# Patient Record
Sex: FEMALE | Race: OTHER | HISPANIC OR LATINO | URBAN - METROPOLITAN AREA
[De-identification: names, ages, dates, MRNs, and addresses within clinical notes are randomized per-mention and may not be internally consistent; named-entity substitution may affect disease eponyms.]

---

## 2017-09-22 ENCOUNTER — EMERGENCY (EMERGENCY)
Facility: HOSPITAL | Age: 62
LOS: 1 days | Discharge: HOME | End: 2017-09-22
Admitting: EMERGENCY MEDICINE
Payer: MEDICAID

## 2017-09-22 VITALS
OXYGEN SATURATION: 98 % | RESPIRATION RATE: 16 BRPM | DIASTOLIC BLOOD PRESSURE: 98 MMHG | HEART RATE: 82 BPM | TEMPERATURE: 98 F | SYSTOLIC BLOOD PRESSURE: 161 MMHG

## 2017-09-22 DIAGNOSIS — F33.2 MAJOR DEPRESSIVE DISORDER, RECURRENT SEVERE WITHOUT PSYCHOTIC FEATURES: ICD-10-CM

## 2017-09-22 DIAGNOSIS — N83.20 UNSPECIFIED OVARIAN CYSTS: Chronic | ICD-10-CM

## 2017-09-22 DIAGNOSIS — Z90.89 ACQUIRED ABSENCE OF OTHER ORGANS: Chronic | ICD-10-CM

## 2017-09-22 DIAGNOSIS — G56.01 CARPAL TUNNEL SYNDROME, RIGHT UPPER LIMB: Chronic | ICD-10-CM

## 2017-09-22 LAB
ALBUMIN SERPL ELPH-MCNC: 4.7 G/DL — SIGNIFICANT CHANGE UP (ref 3.3–5)
ALP SERPL-CCNC: 138 U/L — HIGH (ref 40–120)
ALT FLD-CCNC: 89 U/L — HIGH (ref 4–33)
AMPHET UR-MCNC: NEGATIVE — SIGNIFICANT CHANGE UP
APAP SERPL-MCNC: < 15 UG/ML — LOW (ref 15–25)
APPEARANCE UR: CLEAR — SIGNIFICANT CHANGE UP
AST SERPL-CCNC: 64 U/L — HIGH (ref 4–32)
BACTERIA # UR AUTO: SIGNIFICANT CHANGE UP
BARBITURATES MEASUREMENT: NEGATIVE — SIGNIFICANT CHANGE UP
BARBITURATES UR SCN-MCNC: NEGATIVE — SIGNIFICANT CHANGE UP
BASOPHILS # BLD AUTO: 0.05 K/UL — SIGNIFICANT CHANGE UP (ref 0–0.2)
BASOPHILS NFR BLD AUTO: 0.8 % — SIGNIFICANT CHANGE UP (ref 0–2)
BENZODIAZ SERPL-MCNC: NEGATIVE — SIGNIFICANT CHANGE UP
BENZODIAZ UR-MCNC: NEGATIVE — SIGNIFICANT CHANGE UP
BILIRUB SERPL-MCNC: 0.3 MG/DL — SIGNIFICANT CHANGE UP (ref 0.2–1.2)
BILIRUB UR-MCNC: NEGATIVE — SIGNIFICANT CHANGE UP
BLOOD UR QL VISUAL: NEGATIVE — SIGNIFICANT CHANGE UP
BUN SERPL-MCNC: 13 MG/DL — SIGNIFICANT CHANGE UP (ref 7–23)
CALCIUM SERPL-MCNC: 10 MG/DL — SIGNIFICANT CHANGE UP (ref 8.4–10.5)
CANNABINOIDS UR-MCNC: NEGATIVE — SIGNIFICANT CHANGE UP
CHLORIDE SERPL-SCNC: 104 MMOL/L — SIGNIFICANT CHANGE UP (ref 98–107)
CO2 SERPL-SCNC: 24 MMOL/L — SIGNIFICANT CHANGE UP (ref 22–31)
COCAINE METAB.OTHER UR-MCNC: NEGATIVE — SIGNIFICANT CHANGE UP
COLOR SPEC: SIGNIFICANT CHANGE UP
CREAT SERPL-MCNC: 0.74 MG/DL — SIGNIFICANT CHANGE UP (ref 0.5–1.3)
EOSINOPHIL # BLD AUTO: 0.13 K/UL — SIGNIFICANT CHANGE UP (ref 0–0.5)
EOSINOPHIL NFR BLD AUTO: 2.2 % — SIGNIFICANT CHANGE UP (ref 0–6)
ETHANOL BLD-MCNC: < 10 MG/DL — SIGNIFICANT CHANGE UP
GLUCOSE SERPL-MCNC: 94 MG/DL — SIGNIFICANT CHANGE UP (ref 70–99)
GLUCOSE UR-MCNC: NEGATIVE — SIGNIFICANT CHANGE UP
HCT VFR BLD CALC: 43.3 % — SIGNIFICANT CHANGE UP (ref 34.5–45)
HGB BLD-MCNC: 14.7 G/DL — SIGNIFICANT CHANGE UP (ref 11.5–15.5)
IMM GRANULOCYTES # BLD AUTO: 0.02 # — SIGNIFICANT CHANGE UP
IMM GRANULOCYTES NFR BLD AUTO: 0.3 % — SIGNIFICANT CHANGE UP (ref 0–1.5)
KETONES UR-MCNC: NEGATIVE — SIGNIFICANT CHANGE UP
LEUKOCYTE ESTERASE UR-ACNC: NEGATIVE — SIGNIFICANT CHANGE UP
LYMPHOCYTES # BLD AUTO: 2.82 K/UL — SIGNIFICANT CHANGE UP (ref 1–3.3)
LYMPHOCYTES # BLD AUTO: 47.8 % — HIGH (ref 13–44)
MCHC RBC-ENTMCNC: 29.9 PG — SIGNIFICANT CHANGE UP (ref 27–34)
MCHC RBC-ENTMCNC: 33.9 % — SIGNIFICANT CHANGE UP (ref 32–36)
MCV RBC AUTO: 88.2 FL — SIGNIFICANT CHANGE UP (ref 80–100)
METHADONE UR-MCNC: NEGATIVE — SIGNIFICANT CHANGE UP
MONOCYTES # BLD AUTO: 0.61 K/UL — SIGNIFICANT CHANGE UP (ref 0–0.9)
MONOCYTES NFR BLD AUTO: 10.3 % — SIGNIFICANT CHANGE UP (ref 2–14)
MUCOUS THREADS # UR AUTO: SIGNIFICANT CHANGE UP
NEUTROPHILS # BLD AUTO: 2.27 K/UL — SIGNIFICANT CHANGE UP (ref 1.8–7.4)
NEUTROPHILS NFR BLD AUTO: 38.6 % — LOW (ref 43–77)
NITRITE UR-MCNC: NEGATIVE — SIGNIFICANT CHANGE UP
NRBC # FLD: 0 — SIGNIFICANT CHANGE UP
OPIATES UR-MCNC: NEGATIVE — SIGNIFICANT CHANGE UP
OXYCODONE UR-MCNC: NEGATIVE — SIGNIFICANT CHANGE UP
PCP UR-MCNC: NEGATIVE — SIGNIFICANT CHANGE UP
PH UR: 6.5 — SIGNIFICANT CHANGE UP (ref 4.6–8)
PLATELET # BLD AUTO: 253 K/UL — SIGNIFICANT CHANGE UP (ref 150–400)
PMV BLD: 10.4 FL — SIGNIFICANT CHANGE UP (ref 7–13)
POTASSIUM SERPL-MCNC: 4.5 MMOL/L — SIGNIFICANT CHANGE UP (ref 3.5–5.3)
POTASSIUM SERPL-SCNC: 4.5 MMOL/L — SIGNIFICANT CHANGE UP (ref 3.5–5.3)
PROT SERPL-MCNC: 7.6 G/DL — SIGNIFICANT CHANGE UP (ref 6–8.3)
PROT UR-MCNC: NEGATIVE — SIGNIFICANT CHANGE UP
RBC # BLD: 4.91 M/UL — SIGNIFICANT CHANGE UP (ref 3.8–5.2)
RBC # FLD: 12.8 % — SIGNIFICANT CHANGE UP (ref 10.3–14.5)
RBC CASTS # UR COMP ASSIST: SIGNIFICANT CHANGE UP (ref 0–?)
SALICYLATES SERPL-MCNC: < 5 MG/DL — LOW (ref 15–30)
SODIUM SERPL-SCNC: 145 MMOL/L — SIGNIFICANT CHANGE UP (ref 135–145)
SP GR SPEC: 1.01 — SIGNIFICANT CHANGE UP (ref 1–1.03)
SQUAMOUS # UR AUTO: SIGNIFICANT CHANGE UP
TSH SERPL-MCNC: 0.98 UIU/ML — SIGNIFICANT CHANGE UP (ref 0.27–4.2)
UROBILINOGEN FLD QL: NORMAL E.U. — SIGNIFICANT CHANGE UP (ref 0.1–0.2)
WBC # BLD: 5.9 K/UL — SIGNIFICANT CHANGE UP (ref 3.8–10.5)
WBC # FLD AUTO: 5.9 K/UL — SIGNIFICANT CHANGE UP (ref 3.8–10.5)
WBC UR QL: SIGNIFICANT CHANGE UP (ref 0–?)

## 2017-09-22 PROCEDURE — 99285 EMERGENCY DEPT VISIT HI MDM: CPT | Mod: 25

## 2017-09-22 PROCEDURE — 90792 PSYCH DIAG EVAL W/MED SRVCS: CPT | Mod: GC

## 2017-09-22 PROCEDURE — 93010 ELECTROCARDIOGRAM REPORT: CPT | Mod: 59

## 2017-09-22 NOTE — ED BEHAVIORAL HEALTH ASSESSMENT NOTE - REFERRAL / APPOINTMENT DETAILS
patient instructed to call insurance company for list of referral sources; provided Crisis [facility] number as well as central intake number as backup

## 2017-09-22 NOTE — ED PROVIDER NOTE - OBJECTIVE STATEMENT
61 y/o F hx HTN, Hypothyroid, Depression presents too ER w c/o worsening depression and suicidal ideation. Denies active suicidal plan. Admits to not sleeping and has lost motivation in daily activities. . Denies falling, punching or kicking any objects. Denies pain, SOB, fever, chills , chest/ abdominal discomfort. Denies HI/AH/VH. Denies recent use of alcohol or illicit drugs.

## 2017-09-22 NOTE — ED BEHAVIORAL HEALTH ASSESSMENT NOTE - RISK ASSESSMENT
Patient is currently at moderate risk for suicide. Acute risk factors include major mood episode, thoughts of not wanting to live, ambivalent plan, means, insomnia, and anxiety. Protective factors include future orientation, ability to safety plan, good executive functioning/desire for treatment, and adamant denial of intent.

## 2017-09-22 NOTE — ED BEHAVIORAL HEALTH ASSESSMENT NOTE - CASE SUMMARY
Patient seen and evaluated, agree with above.  Briefly, patient is a 63yo F, employed as an , domiciled with her sister, with history of b/l total knee replacement last year, and history of Lyme disease, referred yesterday by her PCP for evaluation of depression.  Patient endorses significant depressed mood, told resident that she has a supply of meds from her treatment of Lyme disease, and that she thinks taking these medications to kill herself on a snowy winter day is not out of the realm of possibility.  She endorses depressed mood, low energy, feeling guilty re: her feelings of depression.  She declines voluntary inpatient psychiatric hospitalization at this time.  She further stated that she is starting a new job on Monday as a  for the [employer] in the [address].  Reports that she is proud of career and “my skills, my mind” and wants to be able to start this job.  States further that she has a 2yo granddaughter, and that she picks up her granddaughter from  every Tuesday and watches her while her 29yo daughter takes a class.  She lives with her sister (resident obtained collateral info).  Reports that she started [employer] recently (gained a lot of weight after her knee replacement), and plans on cooking for the week this weekend, also plans on going to the beach with her sister.      Patient is future-oriented, and her sister has agreed to keep patient’s medication safe and will keep gun locked away (it is patient’s brother in law’s gun, currently in the garage, patient will have no access).  Patient is agreeable to following up with a psychiatrist.  She will contact her previous psychiatrist from years ago (patient cannot recall name at this time), call her insurance company ([employer]), and will also be given information on the Crisis Clinic for urgent care.  Patient is aware to call 911 or return to ER if symptoms worsen.  Her sister is aware of the plan.

## 2017-09-22 NOTE — ED ADULT TRIAGE NOTE - CHIEF COMPLAINT QUOTE
Pt sent by PCP with increasing depression and Suicidal thoughts.  Pt also reports L eye redness, discharge , pain and itching.  Pt is calm and cooperative at triage.  Denies hallucinations.

## 2017-09-22 NOTE — ED BEHAVIORAL HEALTH ASSESSMENT NOTE - SUMMARY
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 Patient is future oriented, looking forward to work and taking care of her granddaughter and adamantly denies intent. Given her lack of prior suicide attempts and ability to safety plan, including having her sister lock away extra medications as well as guns in the home, as well as desire to follow up with psychiatrist, patient can be safely discharged home with outpatient follow up.
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
## 2017-09-22 NOTE — ED BEHAVIORAL HEALTH ASSESSMENT NOTE - SUICIDE PROTECTIVE FACTORS
Fear of death or dying due to pain/suffering/Identifies reasons for living/Engaged in work or school/Responsibility to family and others/Future oriented/Supportive social network or family

## 2017-09-22 NOTE — ED PROVIDER NOTE - MEDICAL DECISION MAKING DETAILS
63 y/o F hx HTN, Hypothyroid, Depression   Labs, Urine Tox/UA, EKG.   Medical evaluation performed. There is no clinical evidence of intoxication or any acute medical problem requiring immediate intervention. Patient is awaiting psychiatric consultation. Final disposition will be determined by psychiatrist.

## 2017-09-22 NOTE — ED BEHAVIORAL HEALTH ASSESSMENT NOTE - SAFETY PLAN DETAILS
Patient in agreement to have sister lock away medications as well as place guns in a safe away from patient's access. She is in agreement to see psychiatrist or walk in to the crisis clinic. Amenable to returning to the ED or calling 911 if developing worsening symptoms, SI or HI.

## 2017-09-22 NOTE — ED BEHAVIORAL HEALTH ASSESSMENT NOTE - DESCRIPTION
calm and cooperative Lyme disease, HTN, hypothyroidism, pain s/p bilateral knee replacements see HPI
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
## 2017-09-22 NOTE — ED PROVIDER NOTE - FAMILY HISTORY
Father  Still living? Yes, Estimated age: 81-90  History of hypertension, Age at diagnosis: Age Unknown     Mother  Still living? Yes, Estimated age: 81-90  History of hypertension, Age at diagnosis: Age Unknown

## 2022-11-18 ENCOUNTER — APPOINTMENT (OUTPATIENT)
Dept: RADIOLOGY | Facility: CLINIC | Age: 67
End: 2022-11-18

## 2022-11-18 ENCOUNTER — APPOINTMENT (OUTPATIENT)
Dept: MRI IMAGING | Facility: CLINIC | Age: 67
End: 2022-11-18
Payer: MEDICARE

## 2022-11-18 PROCEDURE — 70551 MRI BRAIN STEM W/O DYE: CPT

## 2022-11-24 ENCOUNTER — APPOINTMENT (OUTPATIENT)
Dept: ORTHOPEDIC SURGERY | Facility: CLINIC | Age: 67
End: 2022-11-24
Payer: MEDICARE

## 2022-11-24 DIAGNOSIS — Z00.00 ENCOUNTER FOR GENERAL ADULT MEDICAL EXAMINATION W/OUT ABNORMAL FINDINGS: ICD-10-CM

## 2022-11-24 PROCEDURE — 99204 OFFICE O/P NEW MOD 45 MIN: CPT

## 2022-11-24 PROCEDURE — 73110 X-RAY EXAM OF WRIST: CPT | Mod: RT

## 2022-11-24 RX ORDER — METHYLPREDNISOLONE 4 MG/1
4 TABLET ORAL
Qty: 1 | Refills: 0 | Status: ACTIVE | COMMUNITY
Start: 2022-11-24 | End: 1900-01-01

## 2022-11-24 RX ORDER — DICLOFENAC SODIUM 1% 10 MG/G
1 GEL TOPICAL
Qty: 1 | Refills: 2 | Status: ACTIVE | COMMUNITY
Start: 2022-11-24 | End: 1900-01-01

## 2022-11-24 NOTE — ASSESSMENT
[FreeTextEntry1] : The condition was explained to the patient.\par Discussed that arthritis is progressive, but has a remitting and relapsing course. Discussed treatment ladder - observation, oral NSAIDs, bracing, steroid injection, and ultimately surgery if necessary. \par - recommend thumb spica splint PRN. can use comfort cool thumb CMCJ splint if this is too restrictive.\par - activity modification, OTC adaptive tools/, moist heat PRN.\par - prescribed MDP. discussed potential side effects.\par - prescribed Diclofenac gel. reviewed contra-indicated medical conditions (eg liver disease, kidney disease) or medications (eg blood thinners). Discussed possible blood pressure side effects.\par \par F/u PRN.

## 2022-11-24 NOTE — IMAGING
[de-identified] : RIGHT HAND\par skin intact. mild swelling of volar radial wrist.\par TTP to volar STTJ > thumb CMCJ.\par mild limited wrist extension, flexion.\par good EPL, FPL. good finger extension, flex to full fist. good finger abduction and adduction. \par SILT to median, ulnar, radial distribution. \par brisk cap refill all digits.\par no triggering.\par \par \par XRAY RIGHT WRIST: djd of thumb CMCJ. cystic changes in scaphoid, capitate. no acute displaced fracture or dislocation.

## 2022-11-24 NOTE — HISTORY OF PRESENT ILLNESS
[9] : 9 [de-identified] : 11/24/22: 66yo RHD F with RIGHT volar radial wrist pain for the past month with no traumatic injury. She states she had been preparing lemonade when she began to have pain. Tried ibuprofen, ice, and topical pain creams with little relief. Difficulty with gripping. \par She also admits to RIGHT shoulder issues and had been planning for RIGHT shoulder arthroscopy with an outside orthopedist but had been delayed due to COVID.\par \par Hx: uterine cancer. [FreeTextEntry5] : NP 67 yr old f presenting with right wrist pain. Pt states while making lemonade she began experiencing the pain, states the pain never subsided. Pt states she was suppose to have shoulder surgery before the pandemic and has not been able to go back to the previous provider. Pt states the pain is constant and is now unable to use the right arm or wrist to do anything. the pain is constant sharp, shooting, throbbing, dull/aching feeling is not able to lift the arm nor rotate the wrist due to the pain

## 2022-12-07 ENCOUNTER — APPOINTMENT (OUTPATIENT)
Dept: ORTHOPEDIC SURGERY | Facility: CLINIC | Age: 67
End: 2022-12-07

## 2022-12-07 ENCOUNTER — APPOINTMENT (OUTPATIENT)
Dept: ORTHOPEDIC SURGERY | Facility: CLINIC | Age: 67
End: 2022-12-07
Payer: MEDICARE

## 2022-12-07 VITALS — BODY MASS INDEX: 33.34 KG/M2 | HEIGHT: 68 IN | WEIGHT: 220 LBS

## 2022-12-07 PROCEDURE — 99214 OFFICE O/P EST MOD 30 MIN: CPT

## 2022-12-07 PROCEDURE — 73010 X-RAY EXAM OF SHOULDER BLADE: CPT | Mod: RT

## 2022-12-07 PROCEDURE — 73030 X-RAY EXAM OF SHOULDER: CPT | Mod: RT

## 2022-12-07 NOTE — HISTORY OF PRESENT ILLNESS
[Right Arm] : right arm [Gradual] : gradual [10] : 10 [8] : 8 [Sharp] : sharp [Nothing helps with pain getting better] : Nothing helps with pain getting better [de-identified] : This is Ms. SANDIE SUERO  a 67 year old female who comes in today complaining of right shoulder pain for 2+ years.  She had a known RCT and was supposed to have surgery with an outside orthopedist before COVID but then wasn't able to.  she has not had treatment in the last 2 years. \par  [] : no [de-identified] : carrying things

## 2022-12-07 NOTE — IMAGING
[de-identified] : \par ------------------------------------------------------------------------------------------------------------------------------------------------------\par \par Right shoulder exam: \par \par Skin: no significant pertinent finding\par Inspection: no obvious deformity, no obvious masses, no swelling, no effusion, no atrophy\par ROM: \par    FF: 50 active 150 passive\par    ER: 70\par    IR: T12\par Tenderness:\par    +Anterior/Biceps: \par    (-) Posterior\par    (-) Lateral\par    (-) Trapezius\par    (-) Scapula\par    (-) AC joint\par    (-) Crepitus with ROM\par Stability: \par    (-) Translation\par    (-) Apprehension\par    (-) Clicking\par Additional tests: \par    +Neer's\par    +Hawkin's\par    (-) Montero's\par    (-) Speed\par    (-) Cross chest adduction\par Strength: \par    FF: 3/5\par    ER: 4/5\par    IR: 4+/5\par    Biceps: 5/5\par    Triceps: 5/5\par    Distal: 5/5\par Neuro: In tact to light touch throughout\par Vascularity: Extremity warm and well perfused\par \par  [Right] : right shoulder [There are no fractures, subluxations or dislocations. No significant abnormalities are seen] : There are no fractures, subluxations or dislocations. No significant abnormalities are seen [Type 3 acromion] : Type 3 acromion [FreeTextEntry1] : large subacrmoial spur. remodeling GT

## 2022-12-07 NOTE — DISCUSSION/SUMMARY
[de-identified] : plan for update mri right shoulder eval rct and eval chronicity, atrophy\par begin PT.\par \par ------------------------------------------------------------------------------------------------------------------------------------------------------\par \par The patient was advised of the diagnosis.  The natural history of the pathology was explained in full. All questions were answered.  The risks and benefits of conservative and interventional treatment alternatives were explained to the patient\par \par \par ------------------------------------------------------------------------------------------------------------------------------------------------------\par \par The patient was advised that an advanced diagnostic/imaging study (MRI/CT/etc) will be ordered to evaluate potential pathology in the affected area(s).  The patient was further advised to follow up in the office to review the results of the study and determine further management that may be indicated.\par \par

## 2022-12-08 ENCOUNTER — APPOINTMENT (OUTPATIENT)
Dept: ORTHOPEDIC SURGERY | Facility: CLINIC | Age: 67
End: 2022-12-08

## 2022-12-08 DIAGNOSIS — M18.11 UNILATERAL PRIMARY OSTEOARTHRITIS OF FIRST CARPOMETACARPAL JOINT, RIGHT HAND: ICD-10-CM

## 2022-12-08 DIAGNOSIS — M19.039 PRIMARY OSTEOARTHRITIS, UNSPECIFIED WRIST: ICD-10-CM

## 2022-12-08 PROCEDURE — 99214 OFFICE O/P EST MOD 30 MIN: CPT | Mod: 25

## 2022-12-08 PROCEDURE — 20605 DRAIN/INJ JOINT/BURSA W/O US: CPT | Mod: RT

## 2022-12-08 NOTE — IMAGING
[de-identified] : RIGHT HAND\par skin intact. mild swelling of volar radial wrist.\par TTP to volar STTJ >> thumb CMCJ.\par mild limited wrist extension, flexion.\par good EPL, FPL. good finger extension, flex to full fist. good finger abduction and adduction. \par SILT to median, ulnar, radial distribution. \par brisk cap refill all digits.\par no triggering.

## 2022-12-08 NOTE — ASSESSMENT
[FreeTextEntry1] : Patient would like to proceed with CSI.\par - Discussed risks, benefits, and alternatives as well as contents of injection. Risks include, but are not limited allergic reaction, flare reaction, injection site pain, bruising, numbness, increased blood sugar, skin discoloration, fat atrophy, tendon rupture, and infection. Risk of immune suppression and increased susceptibility to infection with steroid use. Patient expressed understanding and would like to proceed with injection.\par - The skin over the RIGHT volar STTJ was cleansed with alcohol/betadine and anesthetized with ethyl chloride and 0.5cc of 1% lidocaine. The joint was injected with 6mg of celestone. Site was dressed with a band-aid. Patient tolerated the procedure well.\par - OTC pain medication, activity modification, ice PRN.\par \par F/u PRN.

## 2022-12-08 NOTE — HISTORY OF PRESENT ILLNESS
[de-identified] : 12/8/22: f/u djd of RIGHT thumb CMCJ and STTJ. reports MDP only helped for a few days. continues to have significant pain and swelling.\par \par 11/24/22: 68yo RHD F with RIGHT volar radial wrist pain for the past month with no traumatic injury. She states she had been preparing lemonade when she began to have pain. Tried ibuprofen, ice, and topical pain creams with little relief. Difficulty with gripping. \par She also admits to RIGHT shoulder issues and had been planning for RIGHT shoulder arthroscopy with an outside orthopedist but had been delayed due to COVID.\par \par Hx: uterine cancer. [FreeTextEntry5] : 67 yr old f present for f/u on right wrist, states is doing the same since last visit pain is constant sharp dull/aching is unable to apply pressure the wrist or thumb, has been putting ice on it wearing the braces still no relief. would like a cortisone injection

## 2022-12-14 ENCOUNTER — APPOINTMENT (OUTPATIENT)
Dept: ORTHOPEDIC SURGERY | Facility: CLINIC | Age: 67
End: 2022-12-14

## 2022-12-15 ENCOUNTER — FORM ENCOUNTER (OUTPATIENT)
Age: 67
End: 2022-12-15

## 2022-12-16 ENCOUNTER — APPOINTMENT (OUTPATIENT)
Dept: MRI IMAGING | Facility: CLINIC | Age: 67
End: 2022-12-16
Payer: MEDICARE

## 2022-12-16 PROCEDURE — 73221 MRI JOINT UPR EXTREM W/O DYE: CPT | Mod: RT

## 2023-01-04 ENCOUNTER — APPOINTMENT (OUTPATIENT)
Dept: ORTHOPEDIC SURGERY | Facility: CLINIC | Age: 68
End: 2023-01-04
Payer: MEDICARE

## 2023-01-04 VITALS — WEIGHT: 220 LBS | HEIGHT: 68 IN | BODY MASS INDEX: 33.34 KG/M2

## 2023-01-04 DIAGNOSIS — M75.121 COMPLETE ROTATOR CUFF TEAR OR RUPTURE OF RIGHT SHOULDER, NOT SPECIFIED AS TRAUMATIC: ICD-10-CM

## 2023-01-04 DIAGNOSIS — M75.21 BICIPITAL TENDINITIS, RIGHT SHOULDER: ICD-10-CM

## 2023-01-04 DIAGNOSIS — M19.011 PRIMARY OSTEOARTHRITIS, RIGHT SHOULDER: ICD-10-CM

## 2023-01-04 PROCEDURE — 99215 OFFICE O/P EST HI 40 MIN: CPT

## 2023-01-04 NOTE — HISTORY OF PRESENT ILLNESS
[Right Arm] : right arm [Gradual] : gradual [10] : 10 [8] : 8 [Sharp] : sharp [Nothing helps with pain getting better] : Nothing helps with pain getting better [de-identified] : This is Ms. SANDIE SUERO  a 67 year old female who comes in today complaining of right shoulder pain for 2+ years.  She had a known RCT and was supposed to have surgery with an outside orthopedist before COVID but then wasn't able to.  she has not had treatment in the last 2 years. \par  [] : no [FreeTextEntry1] : Right Shoulder  [FreeTextEntry5] : Stated the right shoulder is useless and there doing d pt for the right shoulder.  [de-identified] : carrying things

## 2023-01-04 NOTE — IMAGING
[Right] : right shoulder [There are no fractures, subluxations or dislocations. No significant abnormalities are seen] : There are no fractures, subluxations or dislocations. No significant abnormalities are seen [Type 3 acromion] : Type 3 acromion [de-identified] : \par ------------------------------------------------------------------------------------------------------------------------------------------------------\par \par Right shoulder exam: \par \par Skin: no significant pertinent finding\par Inspection: no obvious deformity, no obvious masses, no swelling, no effusion, no atrophy\par ROM: \par    FF: 50 active 150 passive\par    ER: 70\par    IR: T12\par Tenderness:\par    +Anterior/Biceps: \par    (-) Posterior\par    (-) Lateral\par    (-) Trapezius\par    (-) Scapula\par    (-) AC joint\par    (-) Crepitus with ROM\par Stability: \par    (-) Translation\par    (-) Apprehension\par    (-) Clicking\par Additional tests: \par    +Neer's\par    +Hawkin's\par    (-) Montero's\par    (-) Speed\par    (-) Cross chest adduction\par Strength: \par    FF: 3/5\par    ER: 4/5\par    IR: 4+/5\par    Biceps: 5/5\par    Triceps: 5/5\par    Distal: 5/5\par Neuro: In tact to light touch throughout\par Vascularity: Extremity warm and well perfused\par \par  [FreeTextEntry1] : large subacrmoial spur. remodeling GT

## 2023-01-04 NOTE — DISCUSSION/SUMMARY
[de-identified] : discussed mri findings. advised that she needs some additional sequences. disucssed given mri findings and her hx and she has a large rot cuff tear with likley chronicity. discussed proximal humeral migration, tear retraction and likely some atrophy. advised tear may not be amenable to repair. discused conservative options in detail, however given her generalized significant dysfunction surgery is an option for attempting repair vs possible inspace balloon. advised patient even with repair, failure to heal is possible. discussed i have not previously performed inspace procedure. disccussed additional, SAD, GHD, and possible biceps tenotomy. discussed risks of luiza and option of tenodesis. advised presence of some OA and inferior results of arthroscopy in setting of oa.  discussed all RBA/rehab/recovery related to surgery. and patient wished to proceed with intraoperative decisions at our discretion. \par \par ------------------------------------------------------------------------------------------------------------------------------------------------------\par \par The patient was advised of the diagnosis.  The natural history of the pathology was explained in full. All questions were answered.  The risks and benefits of conservative and interventional treatment alternatives were explained to the patient\par \par ----------------------------------------------------------------------------\par \par MRI(s) and/or other advanced imaging studies (CT/etc) were reviewed with the patient. Implications of the study(ies) together with the patient's clinical picture were discussed to formulate a working diagnosis and management options were detailed.\par \par ----------------------------------------------------------------------------\par \par The patient was advised of the diagnosis.  The natural history of the pathology was explained in full to the patient. All questions were answered.  The risks and benefits of surgical and non-surgical treatment were explained in full to the patient.  The patient demonstrated a full understanding of the surgical and non-surgical options.  The risks of surgery were outlined in full to the patient including but not limited to pain, stiffness, bleeding, scarring, infection, neurologic injury, vascular injury, failure to resolve symptoms, symptom recurrence, the need for further surgery, non-healing, implant failure, intraoperative fracture, wound breakdown, deep vein thrombosis, pulmonary embolism, anesthesia complications and even death.  The patient understood all the risks and accepted them and understood that other complications could occur that are not mentioned above.  The intraoperative plan, post-operative plan, post-operative expectations and limitations were explained in full.  Importance of postoperative rehabilitation and restriction compliance was explained and emphasized. Expectations from non-surgical treatment were explained in full as well.  The patient demonstrated a complete understanding of the treatment detailed, the risks and alternatives.\par \par

## 2023-01-05 ENCOUNTER — APPOINTMENT (OUTPATIENT)
Dept: MRI IMAGING | Facility: CLINIC | Age: 68
End: 2023-01-05
Payer: MEDICARE

## 2023-01-05 PROCEDURE — 73221 MRI JOINT UPR EXTREM W/O DYE: CPT | Mod: RT
